# Patient Record
Sex: FEMALE | Race: WHITE | NOT HISPANIC OR LATINO | Employment: UNEMPLOYED | URBAN - METROPOLITAN AREA
[De-identification: names, ages, dates, MRNs, and addresses within clinical notes are randomized per-mention and may not be internally consistent; named-entity substitution may affect disease eponyms.]

---

## 2023-11-18 PROBLEM — U07.1 COVID-19: Status: ACTIVE | Noted: 2023-11-18

## 2023-11-18 PROBLEM — R05.1 ACUTE COUGH: Status: ACTIVE | Noted: 2023-11-18

## 2023-11-18 PROBLEM — R07.0 THROAT PAIN: Status: ACTIVE | Noted: 2023-11-18

## 2024-01-17 PROBLEM — R05.1 ACUTE COUGH: Status: RESOLVED | Noted: 2023-11-18 | Resolved: 2024-01-17

## 2024-07-24 ENCOUNTER — HOSPITAL ENCOUNTER (EMERGENCY)
Facility: HOSPITAL | Age: 34
Discharge: HOME | End: 2024-07-24
Attending: EMERGENCY MEDICINE
Payer: COMMERCIAL

## 2024-07-24 ENCOUNTER — OFFICE VISIT (OUTPATIENT)
Dept: URGENT CARE | Facility: CLINIC | Age: 34
End: 2024-07-24
Payer: COMMERCIAL

## 2024-07-24 ENCOUNTER — APPOINTMENT (EMERGENCY)
Dept: CT IMAGING | Facility: HOSPITAL | Age: 34
End: 2024-07-24
Payer: COMMERCIAL

## 2024-07-24 VITALS
WEIGHT: 171 LBS | HEART RATE: 53 BPM | DIASTOLIC BLOOD PRESSURE: 71 MMHG | BODY MASS INDEX: 29.19 KG/M2 | RESPIRATION RATE: 18 BRPM | OXYGEN SATURATION: 96 % | HEIGHT: 64 IN | TEMPERATURE: 97.8 F | SYSTOLIC BLOOD PRESSURE: 146 MMHG

## 2024-07-24 VITALS
OXYGEN SATURATION: 96 % | RESPIRATION RATE: 18 BRPM | HEART RATE: 70 BPM | TEMPERATURE: 98.2 F | SYSTOLIC BLOOD PRESSURE: 131 MMHG | DIASTOLIC BLOOD PRESSURE: 85 MMHG

## 2024-07-24 DIAGNOSIS — K29.70 GASTRITIS: Primary | ICD-10-CM

## 2024-07-24 DIAGNOSIS — R11.2 NAUSEA AND VOMITING: ICD-10-CM

## 2024-07-24 DIAGNOSIS — R10.13 EPIGASTRIC PAIN: Primary | ICD-10-CM

## 2024-07-24 DIAGNOSIS — K52.9 ENTERITIS: ICD-10-CM

## 2024-07-24 LAB
ALBUMIN SERPL BCG-MCNC: 4.4 G/DL (ref 3.5–5)
ALP SERPL-CCNC: 48 U/L (ref 34–104)
ALT SERPL W P-5'-P-CCNC: 25 U/L (ref 7–52)
ANION GAP SERPL CALCULATED.3IONS-SCNC: 9 MMOL/L (ref 4–13)
AST SERPL W P-5'-P-CCNC: 18 U/L (ref 13–39)
BASOPHILS # BLD AUTO: 0.02 THOUSANDS/ÂΜL (ref 0–0.1)
BASOPHILS NFR BLD AUTO: 0 % (ref 0–1)
BILIRUB SERPL-MCNC: 0.39 MG/DL (ref 0.2–1)
BILIRUB UR QL STRIP: NEGATIVE
BUN SERPL-MCNC: 10 MG/DL (ref 5–25)
CALCIUM SERPL-MCNC: 9 MG/DL (ref 8.4–10.2)
CHLORIDE SERPL-SCNC: 104 MMOL/L (ref 96–108)
CLARITY UR: CLEAR
CO2 SERPL-SCNC: 25 MMOL/L (ref 21–32)
COLOR UR: YELLOW
CREAT SERPL-MCNC: 0.91 MG/DL (ref 0.6–1.3)
EOSINOPHIL # BLD AUTO: 0.05 THOUSAND/ÂΜL (ref 0–0.61)
EOSINOPHIL NFR BLD AUTO: 1 % (ref 0–6)
ERYTHROCYTE [DISTWIDTH] IN BLOOD BY AUTOMATED COUNT: 12.9 % (ref 11.6–15.1)
EXT PREGNANCY TEST URINE: NEGATIVE
EXT. CONTROL: NORMAL
GFR SERPL CREATININE-BSD FRML MDRD: 82 ML/MIN/1.73SQ M
GLUCOSE SERPL-MCNC: 83 MG/DL (ref 65–140)
GLUCOSE UR STRIP-MCNC: NEGATIVE MG/DL
HCT VFR BLD AUTO: 42.4 % (ref 34.8–46.1)
HGB BLD-MCNC: 13.6 G/DL (ref 11.5–15.4)
HGB UR QL STRIP.AUTO: NEGATIVE
IMM GRANULOCYTES # BLD AUTO: 0.02 THOUSAND/UL (ref 0–0.2)
IMM GRANULOCYTES NFR BLD AUTO: 0 % (ref 0–2)
KETONES UR STRIP-MCNC: ABNORMAL MG/DL
LEUKOCYTE ESTERASE UR QL STRIP: NEGATIVE
LIPASE SERPL-CCNC: 29 U/L (ref 11–82)
LYMPHOCYTES # BLD AUTO: 1.71 THOUSANDS/ÂΜL (ref 0.6–4.47)
LYMPHOCYTES NFR BLD AUTO: 22 % (ref 14–44)
MCH RBC QN AUTO: 30.5 PG (ref 26.8–34.3)
MCHC RBC AUTO-ENTMCNC: 32.1 G/DL (ref 31.4–37.4)
MCV RBC AUTO: 95 FL (ref 82–98)
MONOCYTES # BLD AUTO: 0.67 THOUSAND/ÂΜL (ref 0.17–1.22)
MONOCYTES NFR BLD AUTO: 9 % (ref 4–12)
NEUTROPHILS # BLD AUTO: 5.37 THOUSANDS/ÂΜL (ref 1.85–7.62)
NEUTS SEG NFR BLD AUTO: 68 % (ref 43–75)
NITRITE UR QL STRIP: NEGATIVE
NRBC BLD AUTO-RTO: 0 /100 WBCS
PH UR STRIP.AUTO: 6 [PH]
PLATELET # BLD AUTO: 243 THOUSANDS/UL (ref 149–390)
PMV BLD AUTO: 10.4 FL (ref 8.9–12.7)
POTASSIUM SERPL-SCNC: 3.6 MMOL/L (ref 3.5–5.3)
PROT SERPL-MCNC: 7.3 G/DL (ref 6.4–8.4)
PROT UR STRIP-MCNC: NEGATIVE MG/DL
RBC # BLD AUTO: 4.46 MILLION/UL (ref 3.81–5.12)
SODIUM SERPL-SCNC: 138 MMOL/L (ref 135–147)
SP GR UR STRIP.AUTO: 1.02
UROBILINOGEN UR QL STRIP.AUTO: 0.2 E.U./DL
WBC # BLD AUTO: 7.84 THOUSAND/UL (ref 4.31–10.16)

## 2024-07-24 PROCEDURE — 81003 URINALYSIS AUTO W/O SCOPE: CPT | Performed by: PHYSICIAN ASSISTANT

## 2024-07-24 PROCEDURE — 85025 COMPLETE CBC W/AUTO DIFF WBC: CPT | Performed by: PHYSICIAN ASSISTANT

## 2024-07-24 PROCEDURE — 36415 COLL VENOUS BLD VENIPUNCTURE: CPT | Performed by: PHYSICIAN ASSISTANT

## 2024-07-24 PROCEDURE — 74177 CT ABD & PELVIS W/CONTRAST: CPT

## 2024-07-24 PROCEDURE — 96375 TX/PRO/DX INJ NEW DRUG ADDON: CPT

## 2024-07-24 PROCEDURE — 99213 OFFICE O/P EST LOW 20 MIN: CPT | Performed by: PHYSICIAN ASSISTANT

## 2024-07-24 PROCEDURE — 99284 EMERGENCY DEPT VISIT MOD MDM: CPT

## 2024-07-24 PROCEDURE — 96374 THER/PROPH/DIAG INJ IV PUSH: CPT

## 2024-07-24 PROCEDURE — 99284 EMERGENCY DEPT VISIT MOD MDM: CPT | Performed by: EMERGENCY MEDICINE

## 2024-07-24 PROCEDURE — 81025 URINE PREGNANCY TEST: CPT | Performed by: PHYSICIAN ASSISTANT

## 2024-07-24 PROCEDURE — 80053 COMPREHEN METABOLIC PANEL: CPT | Performed by: PHYSICIAN ASSISTANT

## 2024-07-24 PROCEDURE — 96361 HYDRATE IV INFUSION ADD-ON: CPT

## 2024-07-24 PROCEDURE — 83690 ASSAY OF LIPASE: CPT | Performed by: PHYSICIAN ASSISTANT

## 2024-07-24 RX ORDER — OMEPRAZOLE 20 MG/1
20 CAPSULE, DELAYED RELEASE ORAL DAILY
Qty: 30 CAPSULE | Refills: 0 | Status: SHIPPED | OUTPATIENT
Start: 2024-07-24 | End: 2024-08-01

## 2024-07-24 RX ORDER — MAGNESIUM HYDROXIDE/ALUMINUM HYDROXICE/SIMETHICONE 120; 1200; 1200 MG/30ML; MG/30ML; MG/30ML
30 SUSPENSION ORAL ONCE
Status: COMPLETED | OUTPATIENT
Start: 2024-07-24 | End: 2024-07-24

## 2024-07-24 RX ORDER — ONDANSETRON 2 MG/ML
4 INJECTION INTRAMUSCULAR; INTRAVENOUS ONCE
Status: COMPLETED | OUTPATIENT
Start: 2024-07-24 | End: 2024-07-24

## 2024-07-24 RX ORDER — KETOROLAC TROMETHAMINE 30 MG/ML
15 INJECTION, SOLUTION INTRAMUSCULAR; INTRAVENOUS ONCE
Status: COMPLETED | OUTPATIENT
Start: 2024-07-24 | End: 2024-07-24

## 2024-07-24 RX ORDER — ONDANSETRON 4 MG/1
4 TABLET, ORALLY DISINTEGRATING ORAL EVERY 6 HOURS PRN
Qty: 10 TABLET | Refills: 0 | Status: SHIPPED | OUTPATIENT
Start: 2024-07-24 | End: 2024-08-01 | Stop reason: SDUPTHER

## 2024-07-24 RX ORDER — LIDOCAINE HYDROCHLORIDE 20 MG/ML
15 SOLUTION OROPHARYNGEAL ONCE
Status: COMPLETED | OUTPATIENT
Start: 2024-07-24 | End: 2024-07-24

## 2024-07-24 RX ORDER — SUCRALFATE 1 G/1
1 TABLET ORAL
Qty: 30 TABLET | Refills: 0 | Status: SHIPPED | OUTPATIENT
Start: 2024-07-24

## 2024-07-24 RX ADMIN — Medication 15 ML: at 15:23

## 2024-07-24 RX ADMIN — IOHEXOL 100 ML: 350 INJECTION, SOLUTION INTRAVENOUS at 15:53

## 2024-07-24 RX ADMIN — ALUMINUM HYDROXIDE, MAGNESIUM HYDROXIDE, DIMETHICONE 30 ML: 400; 400; 40 SUSPENSION ORAL at 15:19

## 2024-07-24 RX ADMIN — SODIUM CHLORIDE 1000 ML: 0.9 INJECTION, SOLUTION INTRAVENOUS at 15:18

## 2024-07-24 RX ADMIN — ONDANSETRON 4 MG: 2 INJECTION INTRAMUSCULAR; INTRAVENOUS at 15:54

## 2024-07-24 RX ADMIN — KETOROLAC TROMETHAMINE 15 MG: 30 INJECTION, SOLUTION INTRAMUSCULAR; INTRAVENOUS at 15:21

## 2024-07-24 NOTE — DISCHARGE INSTRUCTIONS
Follow-up with the gastroenterologist listed    Eat bland food, do not drink alcohol    Return with any worsening symptoms questions comments or concerns

## 2024-07-24 NOTE — PROGRESS NOTES
[facility]    NAME: Frances Kothari is a 33 y.o. female  : 1990    MRN: [id number]  DATE: 2024  TIME: 2:43 PM    Assessment and Plan   Epigastric pain [R10.13]  1. Epigastric pain            Patient Instructions     Patient Instructions   Patient is very uncomfortable.  Recommend evaluation in the emergency room.      Chief Complaint     Chief Complaint   Patient presents with    Abdominal Pain     Started 2 weeks getting worse        History of Present Illness   33-year-old female here with complaint of epigastric pain.  Pain has gotten worse over the last 2 weeks.  Associated with nausea and vomiting.  States that she is very uncomfortable.  She cannot bend over due to increased pain.  States that she had similar pain prior to having her daughter and was worked up for possible gallbladder disease which is negative.  States some of her gallbladder tests were normal.  After her pregnancy the symptoms have resolved.  But have now reoccurred.  Denies any change in her bowel.  No fever or chills.    Abdominal Pain  Associated symptoms include nausea and vomiting. Pertinent negatives include no constipation, diarrhea, dysuria, fever, frequency, hematuria or myalgias.       Review of Systems   Review of Systems   Constitutional:  Negative for activity change, appetite change, chills, fatigue and fever.   Respiratory:  Negative for cough.    Cardiovascular:  Negative for chest pain.   Gastrointestinal:  Positive for abdominal pain, nausea and vomiting. Negative for constipation and diarrhea.   Genitourinary:  Negative for difficulty urinating, dysuria, flank pain, frequency, hematuria and urgency.   Musculoskeletal:  Negative for back pain and myalgias.   All other systems reviewed and are negative.      Current Medications     Current Outpatient Medications:     Cryselle-28 0.3-30 MG-MCG per tablet, Take 1 tablet by mouth daily, Disp: , Rfl:     ondansetron (ZOFRAN) 4 mg tablet, Take 1  tablet (4 mg total) by mouth every 8 (eight) hours as needed for nausea or vomiting for up to 5 days, Disp: 15 tablet, Rfl: 0    traZODone (DESYREL) 50 mg tablet, Take 50 mg by mouth every evening, Disp: , Rfl:     venlafaxine (EFFEXOR-XR) 37.5 mg 24 hr capsule, Take 37.5 mg by mouth daily, Disp: , Rfl:     Current Allergies     Allergies as of 07/24/2024    (No Known Allergies)          The following portions of the patient's history were reviewed and updated as appropriate: allergies, current medications, past family history, past medical history, past social history, past surgical history and problem list.   Past Medical History:   Diagnosis Date    Depression     Psychiatric disorder      No past surgical history on file.  No family history on file.  Social History     Socioeconomic History    Marital status:      Spouse name: Not on file    Number of children: Not on file    Years of education: Not on file    Highest education level: Not on file   Occupational History    Not on file   Tobacco Use    Smoking status: Never    Smokeless tobacco: Never   Vaping Use    Vaping status: Never Used   Substance and Sexual Activity    Alcohol use: Not Currently    Drug use: Never    Sexual activity: Not Currently   Other Topics Concern    Not on file   Social History Narrative    ** Merged History Encounter **          Social Determinants of Health     Financial Resource Strain: Not on file   Food Insecurity: Not on file   Transportation Needs: Not on file   Physical Activity: Not on file   Stress: Not on file   Social Connections: Not on file   Intimate Partner Violence: Not on file   Housing Stability: Not on file     Medications have been verified.    Objective   /85   Pulse 70   Temp 98.2 °F (36.8 °C)   Resp 18   SpO2 96%      Physical Exam   Physical Exam  Vitals and nursing note reviewed.   Constitutional:       General: She is not in acute distress.     Appearance: Normal appearance. She is  well-developed.   HENT:      Head: Normocephalic and atraumatic.   Cardiovascular:      Rate and Rhythm: Normal rate and regular rhythm.      Heart sounds: Normal heart sounds. No murmur heard.  Pulmonary:      Effort: Pulmonary effort is normal. No respiratory distress.      Breath sounds: Normal breath sounds.   Abdominal:      General: Bowel sounds are normal.      Tenderness: There is abdominal tenderness in the epigastric area. There is guarding. There is no CVA tenderness.

## 2024-07-24 NOTE — ED PROVIDER NOTES
History  Chief Complaint   Patient presents with    Abdominal Pain     Pt endorses abdominal pain; concerned for hernia; + nausea + vomiting x last night     Is a 33-year-old female patient who presents with concern that she has a hiatal hernia.  States when she was pregnant and her OB doctor states she may have a hiatal hernia because she had nausea and acid reflux.  She had a chest x-ray around the same time which did not show any hiatal hernia.  She takes Pepcid once a day but has burning in her epigastric area and some sour taste in her mouth.  It is associated with nausea and vomiting since yesterday.  She is able to keep something down.  Is not associated with fever she has had a previous cholecystectomy.  There is no cough or sore throat no chest pain or shortness of breath no diarrhea or lower abdominal pain.  This pain does not radiate to her back she denies drinking alcohol chronically.  Differential diagnose include not limited to gastritis, GERD, pancreatitis      Abdominal Pain  Associated symptoms: nausea and vomiting    Associated symptoms: no chest pain, no constipation, no cough, no diarrhea, no dysuria, no fatigue, no fever, no shortness of breath and no sore throat        Prior to Admission Medications   Prescriptions Last Dose Informant Patient Reported? Taking?   Cryselle-28 0.3-30 MG-MCG per tablet   Yes No   Sig: Take 1 tablet by mouth daily   traZODone (DESYREL) 50 mg tablet   Yes No   Sig: Take 50 mg by mouth every evening   venlafaxine (EFFEXOR-XR) 37.5 mg 24 hr capsule   Yes No   Sig: Take 37.5 mg by mouth daily      Facility-Administered Medications: None       Past Medical History:   Diagnosis Date    Depression     Psychiatric disorder        History reviewed. No pertinent surgical history.    History reviewed. No pertinent family history.  I have reviewed and agree with the history as documented.    E-Cigarette/Vaping    E-Cigarette Use Never User      E-Cigarette/Vaping Substances     Nicotine No     Flavoring No      Social History     Tobacco Use    Smoking status: Never    Smokeless tobacco: Never   Vaping Use    Vaping status: Never Used   Substance Use Topics    Alcohol use: Not Currently    Drug use: Never       Review of Systems   Constitutional:  Negative for diaphoresis, fatigue and fever.   HENT:  Negative for congestion, ear pain, nosebleeds and sore throat.    Eyes:  Negative for photophobia, pain, discharge and visual disturbance.   Respiratory:  Negative for cough, choking, chest tightness, shortness of breath and wheezing.    Cardiovascular:  Negative for chest pain and palpitations.   Gastrointestinal:  Positive for abdominal pain, nausea and vomiting. Negative for abdominal distention, anal bleeding, blood in stool, constipation, diarrhea and rectal pain.   Genitourinary:  Negative for dysuria, flank pain and frequency.   Musculoskeletal:  Negative for back pain, gait problem and joint swelling.   Skin:  Negative for color change and rash.   Neurological:  Negative for dizziness, syncope and headaches.   Psychiatric/Behavioral:  Negative for behavioral problems and confusion. The patient is not nervous/anxious.    All other systems reviewed and are negative.      Physical Exam  Physical Exam  Vitals and nursing note reviewed.   Constitutional:       General: She is not in acute distress.     Appearance: Normal appearance. She is well-developed. She is not ill-appearing, toxic-appearing or diaphoretic.   HENT:      Head: Normocephalic and atraumatic.      Nose: Nose normal. No congestion or rhinorrhea.      Mouth/Throat:      Mouth: Mucous membranes are dry.      Pharynx: Oropharynx is clear. No oropharyngeal exudate or posterior oropharyngeal erythema.   Eyes:      Extraocular Movements: Extraocular movements intact.      Conjunctiva/sclera: Conjunctivae normal.      Pupils: Pupils are equal, round, and reactive to light.   Cardiovascular:      Rate and Rhythm: Normal rate and  regular rhythm.   Pulmonary:      Effort: Pulmonary effort is normal. No respiratory distress.      Breath sounds: Normal breath sounds.   Abdominal:      General: Bowel sounds are normal.      Palpations: Abdomen is soft.      Tenderness: There is abdominal tenderness in the epigastric area. There is guarding. There is no rebound.   Musculoskeletal:         General: Normal range of motion.      Cervical back: Normal range of motion and neck supple. No rigidity or tenderness.      Right lower leg: No edema.      Left lower leg: No edema.   Lymphadenopathy:      Cervical: No cervical adenopathy.   Skin:     General: Skin is warm and dry.      Capillary Refill: Capillary refill takes less than 2 seconds.      Findings: No rash.   Neurological:      General: No focal deficit present.      Mental Status: She is alert and oriented to person, place, and time. Mental status is at baseline.   Psychiatric:         Mood and Affect: Mood normal.         Behavior: Behavior normal.         Vital Signs  ED Triage Vitals   Temperature Pulse Respirations Blood Pressure SpO2   07/24/24 1440 07/24/24 1440 07/24/24 1440 07/24/24 1441 07/24/24 1440   97.8 °F (36.6 °C) 60 18 132/85 96 %      Temp src Heart Rate Source Patient Position - Orthostatic VS BP Location FiO2 (%)   -- 07/24/24 1440 07/24/24 1440 07/24/24 1440 --    Monitor Sitting Right arm       Pain Score       07/24/24 1440       8           Vitals:    07/24/24 1440 07/24/24 1441 07/24/24 1500 07/24/24 1649   BP:  132/85 125/77 146/71   Pulse: 60  56 (!) 53   Patient Position - Orthostatic VS: Sitting  Sitting Sitting         Visual Acuity      ED Medications  Medications   sodium chloride 0.9 % bolus 1,000 mL (0 mL Intravenous Stopped 7/24/24 1647)   ketorolac (TORADOL) injection 15 mg (15 mg Intravenous Given 7/24/24 1521)   aluminum-magnesium hydroxide-simethicone (MAALOX) oral suspension 30 mL (30 mL Oral Given 7/24/24 1519)   Lidocaine Viscous HCl (XYLOCAINE) 2 %  mucosal solution 15 mL (15 mL Swish & Spit Given 7/24/24 1523)   ondansetron (ZOFRAN) injection 4 mg (4 mg Intravenous Given 7/24/24 1554)   iohexol (OMNIPAQUE) 350 MG/ML injection (MULTI-DOSE) 100 mL (100 mL Intravenous Given 7/24/24 1553)       Diagnostic Studies  Results Reviewed       Procedure Component Value Units Date/Time    UA w Reflex to Microscopic w Reflex to Culture [[id number]]  (Abnormal) Collected: 07/24/24 1528    Lab Status: Final result Specimen: Urine, Clean Catch Updated: 07/24/24 1535     Color, UA Yellow     Clarity, UA Clear     Specific Gravity, UA 1.020     pH, UA 6.0     Leukocytes, UA Negative     Nitrite, UA Negative     Protein, UA Negative mg/dl      Glucose, UA Negative mg/dl      Ketones, UA 15 (1+) mg/dl      Urobilinogen, UA 0.2 E.U./dl      Bilirubin, UA Negative     Occult Blood, UA Negative    POCT pregnancy, urine [[id number]]  (Normal) Resulted: 07/24/24 1531    Lab Status: Final result Updated: 07/24/24 1531     EXT Preg Test, Ur Negative     Control Valid    Comprehensive metabolic panel [[id number]] Collected: 07/24/24 1458    Lab Status: Final result Specimen: Blood from Arm, Right Updated: 07/24/24 1522     Sodium 138 mmol/L      Potassium 3.6 mmol/L      Chloride 104 mmol/L      CO2 25 mmol/L      ANION GAP 9 mmol/L      BUN 10 mg/dL      Creatinine 0.91 mg/dL      Glucose 83 mg/dL      Calcium 9.0 mg/dL      AST 18 U/L      ALT 25 U/L      Alkaline Phosphatase 48 U/L      Total Protein 7.3 g/dL      Albumin 4.4 g/dL      Total Bilirubin 0.39 mg/dL      eGFR 82 ml/min/1.73sq m     Narrative:      National Kid[employer] Foundation guidelines for Chronic Kidney Disease (CKD):     Stage 1 with normal or high GFR (GFR > 90 mL/min/1.73 square meters)    Stage 2 Mild CKD (GFR = 60-89 mL/min/1.73 square meters)    Stage 3A Moderate CKD (GFR = 45-59 mL/min/1.73 square meters)    Stage 3B Moderate CKD (GFR = 30-44 mL/min/1.73 square meters)    Stage 4 Severe CKD (GFR = 15-29  mL/min/1.73 square meters)    Stage 5 End Stage CKD (GFR <15 mL/min/1.73 square meters)  Note: GFR calculation is accurate only with a steady state creatinine    Lipase [[id number]]  (Normal) Collected: 07/24/24 1458    Lab Status: Final result Specimen: Blood from Arm, Right Updated: 07/24/24 1522     Lipase 29 u/L     CBC and differential [[id number]] Collected: 07/24/24 1458    Lab Status: Final result Specimen: Blood from Arm, Right Updated: 07/24/24 1502     WBC 7.84 Thousand/uL      RBC 4.46 Million/uL      Hemoglobin 13.6 g/dL      Hematocrit 42.4 %      MCV 95 fL      MCH 30.5 pg      MCHC 32.1 g/dL      RDW 12.9 %      MPV 10.4 fL      Platelets 243 Thousands/uL      nRBC 0 /100 WBCs      Segmented % 68 %      Immature Grans % 0 %      Lymphocytes % 22 %      Monocytes % 9 %      Eosinophils Relative 1 %      Basophils Relative 0 %      Absolute Neutrophils 5.37 Thousands/µL      Absolute Immature Grans 0.02 Thousand/uL      Absolute Lymphocytes 1.71 Thousands/µL      Absolute Monocytes 0.67 Thousand/µL      Eosinophils Absolute 0.05 Thousand/µL      Basophils Absolute 0.02 Thousands/µL                    CT abdomen pelvis with contrast   Final Result by Mynor Ryan MD (07/24 1606)      1.  Fluid-filled top-normal caliber small bowel in the upper abdomen, findings which can be seen with a nonspecific enteritis.   2.  Otherwise no acute findings in the abdomen or pelvis.      The study was marked in EPIC for immediate notification.         Workstation performed: [id number]                    Procedures  Procedures         ED Course                                 SBIRT 20yo+      Flowsheet Row Most Recent Value   Initial Alcohol Screen: US AUDIT-C     1. How often do you have a drink containing alcohol? 0 Filed at: 07/24/2024 1440   2. How many drinks containing alcohol do you have on a typical day you are drinking?  0 Filed at: 07/24/2024 1440   3a. Male UNDER 65: How often do you have five or  more drinks on one occasion? 0 Filed at: 07/24/2024 1440   3b. FEMALE Any Age, or MALE 65+: How often do you have 4 or more drinks on one occassion? 0 Filed at: 07/24/2024 1440   Audit-C Score 0 Filed at: 07/24/2024 1440   ONIEL: How many times in the past year have you...    Used an illegal drug or used a prescription medication for non-medical reasons? Never Filed at: 07/24/2024 1440                      Medical Decision Making  This is a 33-year-old female patient who came in thinking she had a hiatal hernia and has been having severe epigastric pain without referred symptoms.  Has had her gallbladder out.  She is accompanied by nausea with this over the last 24 hours and some vomiting nothing makes it better or worse no fever or chills.  No chest pain or shortness of breath.  Frontal diagnosis cannot limited to GERD, gastritis, pancreatitis    Problems Addressed:  Enteritis: acute illness or injury     Details: Was found to have enteritis on CAT scans.  Gastritis: acute illness or injury     Details: Pain improved with GI cocktail and Toradol  Nausea and vomiting: acute illness or injury     Details: Improved with Zofran    Amount and/or Complexity of Data Reviewed  Labs: ordered. Decision-making details documented in ED Course.     Details: All labs reviewed no acute finding required immediate intervention  Radiology: ordered.     Details: Reviewed results of CAT scan positive enteritis no other findings  Discussion of management or test interpretation with external provider(s): Using joint decision-making patient will be discharged home on Carafate, Prilosec, Zofran with gastroenterology amatory consult told the patient to follow-up with her worsening symptoms Yonis comes concerns she is able to drink water prior to discharge and felt much better verbalized understanding and agreement    Risk  OTC drugs.  Prescription drug management.                 Disposition  Final diagnoses:   Gastritis   Nausea and  vomiting   Enteritis     Time reflects when diagnosis was documented in both MDM as applicable and the Disposition within this note       Time User Action Codes Description Comment    7/24/2024  4:26 PM Dandy Laura Add [K29.70] Gastritis     7/24/2024  4:26 PM Dandy Laura Add [R11.2] Nausea and vomiting     7/24/2024  4:26 PM Dandy Laura Add [K52.9] Enteritis           ED Disposition       ED Disposition   Discharge    Condition   Stable    Date/Time   Wed Jul 24, 2024 1626    Comment   Frances Kothari discharge to home/self care.                   Follow-up Information       Follow up With Specialties Details Why Contact Info Additional Information     [facility] Schedule an appointment as soon as possible for a visit   [address] [place] [id number]  [phone] [facility], [address] [place], [place] [address], [id number], [phone]            Discharge Medication List as of 7/24/2024  4:46 PM        START taking these medications    Details   omeprazole (PriLOSEC) 20 mg delayed release capsule Take 1 capsule (20 mg total) by mouth daily, Starting Wed 7/24/2024, Normal      ondansetron (ZOFRAN-ODT) 4 mg disintegrating tablet Take 1 tablet (4 mg total) by mouth every 6 (six) hours as needed for vomiting or nausea, Starting Wed 7/24/2024, Normal      sucralfate (CARAFATE) 1 g tablet Take 1 tablet (1 g total) by mouth 4 (four) times a day (with meals and at bedtime), Starting Wed 7/24/2024, Normal           CONTINUE these medications which have NOT CHANGED    Details   Cryselle-28 0.3-30 MG-MCG per tablet Take 1 tablet by mouth daily, Starting Tue 10/31/2023, Historical Med      traZODone (DESYREL) 50 mg tablet Take 50 mg by mouth every evening, Starting Fri 11/10/2023, Historical Med      venlafaxine (EFFEXOR-XR) 37.5 mg 24 hr capsule Take 37.5 mg by mouth  daily, Starting Fri 11/10/2023, Historical Med                 PDMP Review       None            ED Provider  Electronically Signed by             Dandy Alas PA-C  07/24/24 1952

## 2024-08-01 ENCOUNTER — CONSULT (OUTPATIENT)
Dept: GASTROENTEROLOGY | Facility: CLINIC | Age: 34
End: 2024-08-01
Payer: COMMERCIAL

## 2024-08-01 ENCOUNTER — TELEPHONE (OUTPATIENT)
Dept: ENDOCRINOLOGY | Facility: CLINIC | Age: 34
End: 2024-08-01

## 2024-08-01 ENCOUNTER — PREP FOR PROCEDURE (OUTPATIENT)
Dept: GASTROENTEROLOGY | Facility: CLINIC | Age: 34
End: 2024-08-01

## 2024-08-01 VITALS
TEMPERATURE: 98.1 F | WEIGHT: 174.2 LBS | SYSTOLIC BLOOD PRESSURE: 124 MMHG | BODY MASS INDEX: 29.74 KG/M2 | OXYGEN SATURATION: 98 % | RESPIRATION RATE: 18 BRPM | HEART RATE: 60 BPM | HEIGHT: 64 IN | DIASTOLIC BLOOD PRESSURE: 72 MMHG

## 2024-08-01 DIAGNOSIS — R11.2 NAUSEA AND VOMITING, UNSPECIFIED VOMITING TYPE: ICD-10-CM

## 2024-08-01 DIAGNOSIS — R10.13 DYSPEPSIA: ICD-10-CM

## 2024-08-01 DIAGNOSIS — K59.04 CHRONIC IDIOPATHIC CONSTIPATION: Primary | ICD-10-CM

## 2024-08-01 DIAGNOSIS — K62.5 RECTAL BLEEDING: ICD-10-CM

## 2024-08-01 PROCEDURE — 99204 OFFICE O/P NEW MOD 45 MIN: CPT | Performed by: STUDENT IN AN ORGANIZED HEALTH CARE EDUCATION/TRAINING PROGRAM

## 2024-08-01 RX ORDER — ONDANSETRON 4 MG/1
4 TABLET, ORALLY DISINTEGRATING ORAL EVERY 6 HOURS PRN
Qty: 30 TABLET | Refills: 0 | Status: SHIPPED | OUTPATIENT
Start: 2024-08-01

## 2024-08-01 RX ORDER — OMEPRAZOLE 40 MG/1
40 CAPSULE, DELAYED RELEASE ORAL DAILY
Qty: 30 CAPSULE | Refills: 11 | Status: SHIPPED | OUTPATIENT
Start: 2024-08-01

## 2024-08-01 NOTE — TELEPHONE ENCOUNTER
Per Dr. Chowdary samples given to Frances Lozano 72g   Lot# [id number]  Exp: 05/2026  Blake 72g  Lot # [id number]  Exp: 09/2026    Linzess 145g X2  Lot # [id number]  Exp: 12/2024

## 2024-08-01 NOTE — PROGRESS NOTES
[facility] - Outpatient Consultation  Frances Kothari 33 y.o. female MRN: [id number]  Encounter: [id number]          ASSESSMENT AND PLAN:    33-year-old female with anxiety/depression here for progression of chronic dyspepsia and nausea.  Also reports lifelong constipation.  I am suspicious her upper symptoms may be a combination of peptic irritation with exacerbation from poor motility related to her chronic constipation.  1. Dyspepsia  - Ambulatory Referral to Gastroenterology  - EGD; Future  - omeprazole (PriLOSEC) 40 MG capsule; Take 1 capsule (40 mg total) by mouth daily  Dispense: 30 capsule; Refill: 11    2. Nausea and vomiting, unspecified vomiting type  - Ambulatory Referral to Gastroenterology  - EGD; Future  - omeprazole (PriLOSEC) 40 MG capsule; Take 1 capsule (40 mg total) by mouth daily  Dispense: 30 capsule; Refill: 11  - ondansetron (ZOFRAN-ODT) 4 mg disintegrating tablet; Take 1 tablet (4 mg total) by mouth every 6 (six) hours as needed for vomiting or nausea  Dispense: 30 tablet; Refill: 0    3. Chronic idiopathic constipation  4. Rectal bleeding  - Colonoscopy; Future  - polyethylene glycol (COLYTE) 4000 mL solution; Take 4,000 mL by mouth once for 1 dose  Dispense: 4000 mL; Refill: 0  - Samples of Linzess given in the office today. Will start with 72 mcg daily and if not effective, increase to 145 mcg daily        ______________________________________________________________________    HPI:    Has been dealing with stomach issues for a while.  Has had hiatal hernia and gallbladder testing.    Over past 2 weeks, getting upper GI symptoms.  Upper abdomen is very tender/nauseated to palpation.  Worse with bending over or tying shoes.  Worse with overeating.  Vomits twice a week. Zofran has been helpful. Started omeprazole last week.  Took famotidine for 2 years before.    Has lifelong constipation.  More recently has not even been getting urge to defecate. Has  hemorrhoid issues, sees blood. Stool is like marbles.  Has tried single doses of miralax but no improvement.  Having BM 2-3 times per week.  No weight loss.    No prior EGD.    7/24/24 CBC, CMP, lipase unremarkable    7/24/24 CT  1.  Fluid-filled top-normal caliber small bowel in the upper abdomen, findings which can be seen with a nonspecific enteritis.  2.  Otherwise no acute findings in the abdomen or pelvis.     The study was marked in EPIC for immediate notification.          Answers submitted by the patient for this visit:  Abdominal Pain Questionnaire (Submitted on 7/29/2024)  Chief Complaint: Abdominal pain  Chronicity: recurrent  Onset: more than 1 year ago  Onset quality: undetermined  Frequency: every several days  Episode duration: 2 Hours  Progression since onset: gradually worsening  Pain location: epigastric region  Pain - numeric: 1/10  Pain quality: cramping  Radiates to: epigastric region  anorexia: No  arthralgias: No  belching: No  constipation: Yes  diarrhea: No  dysuria: No  fever: No  flatus: No  frequency: No  headaches: Yes  hematochezia: No  hematuria: No  melena: No  myalgias: No  nausea: Yes  weight loss: No  vomiting: Yes  Aggravated by: certain positions, coughing, eating, palpation  Relieved by: certain positions, recumbency, vomiting  Diagnostic workup: CT scan    REVIEW OF SYSTEMS:    CONSTITUTIONAL: Denies any fever, chills, rigors, and weight loss.  HEENT: No earache or tinnitus. Denies hearing loss or visual disturbances.  CARDIOVASCULAR: No chest pain or palpitations.   RESPIRATORY: Denies any cough, hemoptysis, shortness of breath or dyspnea on exertion.  GASTROINTESTINAL: As noted in the History of Present Illness.   GENITOURINARY: No problems with urination. Denies any hematuria or dysuria.  NEUROLOGIC: No dizziness or vertigo, denies headaches.   MUSCULOSKELETAL: Denies any muscle or joint pain.   SKIN: Denies skin rashes or itching.   ENDOCRINE: Denies excessive thirst.  "Denies intolerance to heat or cold.  PSYCHOSOCIAL: Denies depression or anxiety. Denies any recent memory loss.       Historical Information   Past Medical History:   Diagnosis Date    Depression     Psychiatric disorder      No past surgical history on file.  Social History   Social History     Substance and Sexual Activity   Alcohol Use Yes    Alcohol/week: 3.0 - 4.0 standard drinks of alcohol    Types: 3 - 4 Standard drinks or equivalent per week     Social History     Substance and Sexual Activity   Drug Use Never     Social History     Tobacco Use   Smoking Status Former    Current packs/day: 0.25    Average packs/day: 0.3 packs/day for 10.0 years (2.5 ttl pk-yrs)    Types: Cigarettes    Passive exposure: Current   Smokeless Tobacco Never     Family History   Problem Relation Age of Onset    Asthma Mother     Diabetes Father     Diabetes Maternal Grandfather     Stomach cancer Paternal Grandfather     Breast cancer Maternal Aunt        Meds/Allergies       Current Outpatient Medications:     Cryselle-28 0.3-30 MG-MCG per tablet    omeprazole (PriLOSEC) 20 mg delayed release capsule    ondansetron (ZOFRAN-ODT) 4 mg disintegrating tablet    sucralfate (CARAFATE) 1 g tablet    traZODone (DESYREL) 50 mg tablet    venlafaxine (EFFEXOR-XR) 37.5 mg 24 hr capsule    No Known Allergies        Objective     Blood pressure 124/72, pulse 60, temperature 98.1 °F (36.7 °C), temperature source Temporal, resp. rate 18, height 5' 4\" (1.626 m), weight 79 kg (174 lb 3.2 oz), SpO2 98%. Body mass index is 29.9 kg/m².        PHYSICAL EXAM:      General Appearance:   Alert, cooperative, no distress   HEENT:   Normocephalic, atraumatic, anicteric.     Neck:  Supple, symmetrical, trachea midline   Lungs:   Clear to auscultation bilaterally; no rales, rhonchi or wheezing; respirations unlabored    Heart::   Regular rate and rhythm; no murmur, rub, or gallop.   Abdomen:   Soft, non-tender, non-distended; normal bowel sounds; no masses, " no organomegaly    Genitalia:   Deferred    Rectal:   Deferred    Extremities:  No cyanosis, clubbing or edema    Pulses:  2+ and symmetric    Skin:  No jaundice, rashes, or lesions    Lymph nodes:  No palpable cervical lymphadenopathy        Lab Results:   No visits with results within 1 Day(s) from this visit.   Latest known visit with results is:   Admission on 07/24/2024, Discharged on 07/24/2024   Component Date Value    WBC 07/24/2024 7.84     RBC 07/24/2024 4.46     Hemoglobin 07/24/2024 13.6     Hematocrit 07/24/2024 42.4     MCV 07/24/2024 95     MCH 07/24/2024 30.5     MCHC 07/24/2024 32.1     RDW 07/24/2024 12.9     MPV 07/24/2024 10.4     Platelets 07/24/2024 243     nRBC 07/24/2024 0     Segmented % 07/24/2024 68     Immature Grans % 07/24/2024 0     Lymphocytes % 07/24/2024 22     Monocytes % 07/24/2024 9     Eosinophils Relative 07/24/2024 1     Basophils Relative 07/24/2024 0     Absolute Neutrophils 07/24/2024 5.37     Absolute Immature Grans 07/24/2024 0.02     Absolute Lymphocytes 07/24/2024 1.71     Absolute Monocytes 07/24/2024 0.67     Eosinophils Absolute 07/24/2024 0.05     Basophils Absolute 07/24/2024 0.02     Sodium 07/24/2024 138     Potassium 07/24/2024 3.6     Chloride 07/24/2024 104     CO2 07/24/2024 25     ANION GAP 07/24/2024 9     BUN 07/24/2024 10     Creatinine 07/24/2024 0.91     Glucose 07/24/2024 83     Calcium 07/24/2024 9.0     AST 07/24/2024 18     ALT 07/24/2024 25     Alkaline Phosphatase 07/24/2024 48     Total Protein 07/24/2024 7.3     Albumin 07/24/2024 4.4     Total Bilirubin 07/24/2024 0.39     eGFR 07/24/2024 82     Lipase 07/24/2024 29     EXT Preg Test, Ur 07/24/2024 Negative     Control 07/24/2024 Valid     Color, UA 07/24/2024 Yellow     Clarity, UA 07/24/2024 Clear     Specific Gravity, UA 07/24/2024 1.020     pH, UA 07/24/2024 6.0     Leukocytes, UA 07/24/2024 Negative     Nitrite, UA 07/24/2024 Negative     Protein, UA 07/24/2024 Negative     Glucose, UA  07/24/2024 Negative     Ketones, UA 07/24/2024 15 (1+) (A)     Urobilinogen, UA 07/24/2024 0.2     Bilirubin, UA 07/24/2024 Negative     Occult Blood, UA 07/24/2024 Negative          Radiology Results:   CT abdomen pelvis with contrast    Result Date: 7/24/2024  Narrative: CT ABDOMEN AND PELVIS WITH IV CONTRAST INDICATION:   abdominal pain more so after epigastic area COMPARISON: None. TECHNIQUE:  CT examination of the abdomen and pelvis was performed. Axial, sagittal, and coronal 2D reformatted images were created from the source data and submitted for interpretation. Radiation dose length product (DLP) for this visit:  662 mGy-cm .  This examination, like all CT scans performed in the [facility], was performed utilizing techniques to minimize radiation dose exposure, including the use of iterative reconstruction and automated exposure control. IV Contrast:  100 mL of iohexol (OMNIPAQUE) Enteric Contrast: None. FINDINGS: ABDOMEN LOWER CHEST: No clinically significant abnormality is identified in the visualized lower chest. No consolidation or effusion. LIVER: Normal size and morphology. No suspicious solid hepatic lesion. There are one or more sharply marginated, homogeneous low-density subcentimeter lesions. In a low-risk patient, an incidental hepatic lesion <1 cm generally does not require further work-up and can be considered benign, particularly when demonstrating no suspicious features. (Reference: J Am Brenda Radiol 2017; 14:7950-9122). Elongated right lobe suggesting Riedel's configuration. BILIARY: No intrahepatic biliary ductal dilatation. Normal caliber common bile duct. GALLBLADDER: No calcified gallstones. Normal wall thickness. No pericholecystic inflammatory changes. SPLEEN: Within normal limits. No suspicious lesion. Normal spleen size. PANCREAS: Pancreatic parenchyma is within normal limits. No main pancreatic ductal dilatation. No pancreatic/peripancreatic inflammation. ADRENAL  GLANDS: Within normal limits. KIDNEYS/URETERS: Normal size and position. Symmetric enhancement. No suspicious lesion. No calcified stones or hydronephrosis. Ureters within normal limits. STOMACH AND BOWEL: Stomach is grossly within normal limits. There are multiple top-normal caliber loops of small bowel in the upper abdomen with air-fluid levels. Normal caliber large bowel. Colonic diverticulosis without acute diverticulitis. No fat stranding. APPENDIX: No findings to suggest acute appendicitis. ABDOMINOPELVIC CAVITY: There is a small volume of free pelvic fluid, likely physiologic given the patient's age and gender.  No pneumoperitoneum.  No lymphadenopathy. No retroperitoneal hematoma. VESSELS: Normal caliber abdominal aorta with no detectable atherosclerotic plaque. The celiac, SMA, and GERARDO are patent. The main, right, and left portal veins are patent. The SMV and splenic vein are patent. The hepatic veins are patent, including accessory inferior right hepatic vein. PELVIS REPRODUCTIVE ORGANS: Retroverted uterus. No adnexal mass. Normal ovaries. URINARY BLADDER: Underdistended limiting evaluation of wall thickness, but otherwise within normal limits. No calculi. ABDOMINAL WALL/INGUINAL REGIONS: Within normal limits. BONES: Vertebral body height is maintained. No acute fracture or destructive osseous lesion.     Impression: 1.  Fluid-filled top-normal caliber small bowel in the upper abdomen, findings which can be seen with a nonspecific enteritis. 2.  Otherwise no acute findings in the abdomen or pelvis. The study was marked in EPIC for immediate notification. Workstation performed: [id number]

## 2024-08-16 ENCOUNTER — TELEPHONE (OUTPATIENT)
Age: 34
End: 2024-08-16

## 2024-08-16 NOTE — TELEPHONE ENCOUNTER
Patient called in to check the status of arrival time for procedure on 8/19/2024. Patient aware the endo center will call between 2-6pm today.